# Patient Record
Sex: MALE | Race: WHITE | HISPANIC OR LATINO | URBAN - METROPOLITAN AREA
[De-identification: names, ages, dates, MRNs, and addresses within clinical notes are randomized per-mention and may not be internally consistent; named-entity substitution may affect disease eponyms.]

---

## 2022-01-26 ENCOUNTER — EMERGENCY (EMERGENCY)
Facility: HOSPITAL | Age: 32
LOS: 1 days | Discharge: HOME | End: 2022-01-26
Attending: EMERGENCY MEDICINE
Payer: SELF-PAY

## 2022-01-26 VITALS
OXYGEN SATURATION: 99 % | SYSTOLIC BLOOD PRESSURE: 171 MMHG | TEMPERATURE: 98 F | HEART RATE: 80 BPM | DIASTOLIC BLOOD PRESSURE: 122 MMHG | RESPIRATION RATE: 20 BRPM

## 2022-01-26 VITALS
WEIGHT: 169.98 LBS | HEIGHT: 70 IN | DIASTOLIC BLOOD PRESSURE: 124 MMHG | SYSTOLIC BLOOD PRESSURE: 184 MMHG | RESPIRATION RATE: 20 BRPM | HEART RATE: 102 BPM | TEMPERATURE: 98 F | OXYGEN SATURATION: 96 %

## 2022-01-26 LAB
ALBUMIN SERPL ELPH-MCNC: 4.2 G/DL — SIGNIFICANT CHANGE UP (ref 3.3–5)
ALP SERPL-CCNC: 163 U/L — HIGH (ref 40–120)
ALT FLD-CCNC: 15 U/L — SIGNIFICANT CHANGE UP (ref 10–45)
ANION GAP SERPL CALC-SCNC: 12 MMOL/L — SIGNIFICANT CHANGE UP (ref 5–17)
AST SERPL-CCNC: 19 U/L — SIGNIFICANT CHANGE UP (ref 10–40)
BASE EXCESS BLDV CALC-SCNC: 0.3 MMOL/L — SIGNIFICANT CHANGE UP (ref -2–2)
BILIRUB SERPL-MCNC: 0.3 MG/DL — SIGNIFICANT CHANGE UP (ref 0.2–1.2)
BUN SERPL-MCNC: 12 MG/DL — SIGNIFICANT CHANGE UP (ref 7–23)
CA-I SERPL-SCNC: 1.18 MMOL/L — SIGNIFICANT CHANGE UP (ref 1.15–1.33)
CALCIUM SERPL-MCNC: 9.4 MG/DL — SIGNIFICANT CHANGE UP (ref 8.4–10.5)
CHLORIDE BLDV-SCNC: 105 MMOL/L — SIGNIFICANT CHANGE UP (ref 96–108)
CHLORIDE SERPL-SCNC: 103 MMOL/L — SIGNIFICANT CHANGE UP (ref 96–108)
CO2 BLDV-SCNC: 27 MMOL/L — HIGH (ref 22–26)
CO2 SERPL-SCNC: 23 MMOL/L — SIGNIFICANT CHANGE UP (ref 22–31)
CREAT SERPL-MCNC: 1.14 MG/DL — SIGNIFICANT CHANGE UP (ref 0.5–1.3)
CRP SERPL-MCNC: 20 MG/L — HIGH (ref 0–4)
GAS PNL BLDV: 135 MMOL/L — LOW (ref 136–145)
GAS PNL BLDV: SIGNIFICANT CHANGE UP
GAS PNL BLDV: SIGNIFICANT CHANGE UP
GLUCOSE BLDV-MCNC: 101 MG/DL — HIGH (ref 70–99)
GLUCOSE SERPL-MCNC: 97 MG/DL — SIGNIFICANT CHANGE UP (ref 70–99)
HCO3 BLDV-SCNC: 26 MMOL/L — SIGNIFICANT CHANGE UP (ref 22–29)
HCT VFR BLD CALC: 46 % — SIGNIFICANT CHANGE UP (ref 39–50)
HCT VFR BLDA CALC: 42 % — SIGNIFICANT CHANGE UP (ref 39–51)
HGB BLD CALC-MCNC: 14 G/DL — SIGNIFICANT CHANGE UP (ref 12.6–17.4)
HGB BLD-MCNC: 14.9 G/DL — SIGNIFICANT CHANGE UP (ref 13–17)
LACTATE BLDV-MCNC: 0.9 MMOL/L — SIGNIFICANT CHANGE UP (ref 0.7–2)
MCHC RBC-ENTMCNC: 31.6 PG — SIGNIFICANT CHANGE UP (ref 27–34)
MCHC RBC-ENTMCNC: 32.4 GM/DL — SIGNIFICANT CHANGE UP (ref 32–36)
MCV RBC AUTO: 97.7 FL — SIGNIFICANT CHANGE UP (ref 80–100)
NRBC # BLD: 0 /100 WBCS — SIGNIFICANT CHANGE UP (ref 0–0)
PCO2 BLDV: 45 MMHG — SIGNIFICANT CHANGE UP (ref 42–55)
PH BLDV: 7.37 — SIGNIFICANT CHANGE UP (ref 7.32–7.43)
PLATELET # BLD AUTO: 316 K/UL — SIGNIFICANT CHANGE UP (ref 150–400)
PO2 BLDV: 35 MMHG — SIGNIFICANT CHANGE UP (ref 25–45)
POTASSIUM BLDV-SCNC: 4.4 MMOL/L — SIGNIFICANT CHANGE UP (ref 3.5–5.1)
POTASSIUM SERPL-MCNC: 4.5 MMOL/L — SIGNIFICANT CHANGE UP (ref 3.5–5.3)
POTASSIUM SERPL-SCNC: 4.5 MMOL/L — SIGNIFICANT CHANGE UP (ref 3.5–5.3)
PROT SERPL-MCNC: 7.7 G/DL — SIGNIFICANT CHANGE UP (ref 6–8.3)
RBC # BLD: 4.71 M/UL — SIGNIFICANT CHANGE UP (ref 4.2–5.8)
RBC # FLD: 12.1 % — SIGNIFICANT CHANGE UP (ref 10.3–14.5)
SAO2 % BLDV: 61.1 % — LOW (ref 67–88)
SODIUM SERPL-SCNC: 138 MMOL/L — SIGNIFICANT CHANGE UP (ref 135–145)
WBC # BLD: 6.1 K/UL — SIGNIFICANT CHANGE UP (ref 3.8–10.5)
WBC # FLD AUTO: 6.1 K/UL — SIGNIFICANT CHANGE UP (ref 3.8–10.5)

## 2022-01-26 PROCEDURE — 86140 C-REACTIVE PROTEIN: CPT

## 2022-01-26 PROCEDURE — 82803 BLOOD GASES ANY COMBINATION: CPT

## 2022-01-26 PROCEDURE — 86038 ANTINUCLEAR ANTIBODIES: CPT

## 2022-01-26 PROCEDURE — 86592 SYPHILIS TEST NON-TREP QUAL: CPT

## 2022-01-26 PROCEDURE — 85652 RBC SED RATE AUTOMATED: CPT

## 2022-01-26 PROCEDURE — 82435 ASSAY OF BLOOD CHLORIDE: CPT

## 2022-01-26 PROCEDURE — 36415 COLL VENOUS BLD VENIPUNCTURE: CPT

## 2022-01-26 PROCEDURE — 82947 ASSAY GLUCOSE BLOOD QUANT: CPT

## 2022-01-26 PROCEDURE — 86703 HIV-1/HIV-2 1 RESULT ANTBDY: CPT

## 2022-01-26 PROCEDURE — 71045 X-RAY EXAM CHEST 1 VIEW: CPT | Mod: 26

## 2022-01-26 PROCEDURE — 84132 ASSAY OF SERUM POTASSIUM: CPT

## 2022-01-26 PROCEDURE — 85014 HEMATOCRIT: CPT

## 2022-01-26 PROCEDURE — 86780 TREPONEMA PALLIDUM: CPT

## 2022-01-26 PROCEDURE — 93005 ELECTROCARDIOGRAM TRACING: CPT

## 2022-01-26 PROCEDURE — 80053 COMPREHEN METABOLIC PANEL: CPT

## 2022-01-26 PROCEDURE — 85018 HEMOGLOBIN: CPT

## 2022-01-26 PROCEDURE — 99285 EMERGENCY DEPT VISIT HI MDM: CPT

## 2022-01-26 PROCEDURE — 85027 COMPLETE CBC AUTOMATED: CPT

## 2022-01-26 PROCEDURE — 71045 X-RAY EXAM CHEST 1 VIEW: CPT

## 2022-01-26 PROCEDURE — 84295 ASSAY OF SERUM SODIUM: CPT

## 2022-01-26 PROCEDURE — 86593 SYPHILIS TEST NON-TREP QUANT: CPT

## 2022-01-26 PROCEDURE — 82330 ASSAY OF CALCIUM: CPT

## 2022-01-26 PROCEDURE — 83605 ASSAY OF LACTIC ACID: CPT

## 2022-01-26 PROCEDURE — 99285 EMERGENCY DEPT VISIT HI MDM: CPT | Mod: 25

## 2022-01-26 NOTE — ED ADULT NURSE NOTE - ED STAT RN HANDOFF DETAILS 2
Report received from change of shift MAGY Trujillo. Patient resting comfortably. Comfort and safety provided.

## 2022-01-26 NOTE — ED PROVIDER NOTE - RAPID ASSESSMENT
31 M w/ PMHx of c/o worsening itching/tingling rash starting on back and spreading to arms and face x 2 weeks. Denies recent travel. COVID vaccinated. Endorses tobacco use, 1 pack every 3 days. Denies sore throat.     **Pt seen in the waiting room via teletriage by Yaron Weiss), documentation completed by Moreno Wheeler. Pt to be sent to main ED for further evaluation - all orders placed to be followed by MD in the main ED**   Scribe Statement: Kristin KENNY Cole (scribe), attest that this documentation has been prepared under the direction and in the presence of Yaron Weiss) 31 M w/ PMHx of c/o worsening mildy itching/tingling rash starting on left shoulder/back k and spreading to arms and face x 2 weeks and chest. Denies recent travel, new meds, foods, plants pets, detergents soaps perfumes, COVID vaccinated. Endorses tobacco use, 1 pack every 3 days. Denies sore throat.     **Pt seen in the waiting room via teletriage by Yaron Weiss), documentation completed by Scribrosa -Moreno Foster. Pt to be sent to main ED for further evaluation - all orders placed to be followed by MD in the main ED**   Scribe Statement: IKristin Cole (scribe), attest that this documentation has been prepared under the direction and in the presence of Yaron Weiss)    PT seen in Triage/Tele/Qdoc w scribe. Full H&P to be performed once pt is seen in main ED.  Yaron Weiss MD, Facep

## 2022-01-26 NOTE — ED PROVIDER NOTE - SKIN, MLM
back and trunk with erythematous raised rash, rose tree pattern without obvious herald lesion, does not floresce, also on face, no involvement of the palms and soles, no oral lesions

## 2022-01-26 NOTE — ED PROVIDER NOTE - OBJECTIVE STATEMENT
31y M w/ no pertinent PMHx presents to the ED c/o worsening rash w/ mild itching/sensitivity, intermittent pain starting on back and spreading to arms, face, chest x2-3wks. Rash is not on legs/feet or mouth. Took Benadryl w/ no relief. Took Naproxen for pain. Denies F/C, N/V, cough, congestion. Denies FMHx of autoimmune disorder. Has not seen his PMD yet. Denies recent travel, new meds, foods, plants, pets, detergents, soaps, IV drug use, hot tub use, gym locker room, perfumes. Pt is current smoker, endorses tobacco use, 1 pack every 3 days.

## 2022-01-26 NOTE — ED PROVIDER NOTE - WR ORDER DATE AND TIME 1
Detail Level: Detailed Quality 130: Documentation Of Current Medications In The Medical Record: Current Medications Documented 26-Jan-2022 21:23

## 2022-01-26 NOTE — ED PROVIDER NOTE - NSFOLLOWUPCLINICS_GEN_ALL_ED_FT
[facility]  [address], [address]  [place] [address]  Phone: [phone]  Fax: [phone]

## 2022-01-26 NOTE — ED PROVIDER NOTE - ATTENDING CONTRIBUTION TO CARE
Pt with rash, started upper back, now diffuse upper back and chest and now face, symmetric, scaling maculopapular rash, started 3wks ago s/p covid booster.  Not really itchy, no fever, no dyspnea.  Well appearing.  Male who has sex with men, uses protection 100% time. No prior STIs.

## 2022-01-26 NOTE — ED ADULT NURSE REASSESSMENT NOTE - NS ED NURSE REASSESS COMMENT FT1
patient remains hypertensive, per MD ring ok to d/c and patient aware to make f/u with PCP for HTN management

## 2022-01-26 NOTE — ED PROVIDER NOTE - NSFOLLOWUPINSTRUCTIONS_ED_ALL_ED_FT
1 person assist/verbal cues
verbal cues/1 person assist
follow up with dermatology in 1-2 days    You need to follow up on your blood pressure it was elevated in the ED. This can be done with your primary care doctor     Rash    A rash is a change in the color of the skin. A rash can also change the way your skin feels. There are many different conditions and factors that can cause a rash, most of which are not dangerous. Make sure to follow up with your primary care physician or a dermatologist as instructed by your health care provider.    SEEK IMMEDIATE MEDICAL CARE IF YOU HAVE ANY OF THE FOLLOWING SYMPTOMS: fever, blisters, a rash inside your mouth, vaginal or anal pain, or altered mental status.

## 2022-01-26 NOTE — ED PROVIDER NOTE - CLINICAL SUMMARY MEDICAL DECISION MAKING FREE TEXT BOX
Dr. Maya Note: scaling maculopapular rash of trunk and face s/p booster shot, consider drug rash versus pityriasis, would also screen for syphilis and HIV (much less likely given sparing of palms/soles), no signs of toxicity, would defer steroid use and have pt seek second opinion with dermatologist outpatient.

## 2022-01-26 NOTE — ED ADULT NURSE NOTE - OBJECTIVE STATEMENT
Patient is a 30 y/o male with no reported significant PMH presenting to the ED via waiting room with c/o body wide rash worsening over 2 weeks and sharp, shooting, intermittent bilateral leg pain for "a few days." Patient denies allergies, denies eating/drinking anything new, denies use of new products or change in regular habits. Rash present on face, face red and blotchy, rash present on arms with red spotting. Patient reports taking benadryl with no improvement. Patient A&Ox4, breathing unlabored, denies SOB, denies chest pain, denies n/v/d, radial/pedal pulses present/strong bilaterally, cap refill less than 2 secs, moving all extremities w/o difficulty, skin warm, dry, denies fever, cough, chills. Comfort and safety maintained.

## 2022-01-26 NOTE — ED PROVIDER NOTE - PATIENT PORTAL LINK FT
You can access the FollowMyHealth Patient Portal offered by [facility] by registering at the following website: [email or web address]. By joining [employer]’s FollowMyHealth portal, you will also be able to view your health information using other applications (apps) compatible with our system.

## 2022-01-26 NOTE — ED ADULT TRIAGE NOTE - CHIEF COMPLAINT QUOTE
x1week of rash started on back, progressively worsening and spreading to face, chest, arms, and stomach  no relief with benadryl  denies SOB, difficulty breathing

## 2022-01-27 LAB
ERYTHROCYTE [SEDIMENTATION RATE] IN BLOOD: 43 MM/HR — HIGH (ref 0–15)
HIV 1 & 2 AB SERPL IA.RAPID: SIGNIFICANT CHANGE UP
RPR SER-TITR: (no result)
RPR SERPL-ACNC: REACTIVE
T PALLIDUM AB TITR SER: POSITIVE

## 2022-01-27 NOTE — ED POST DISCHARGE NOTE - ADDITIONAL DOCUMENTATION
1/30: + helen salcido to call back admin line - Ann Kuhn PA-C 1/30: + omari, lvm to call back admin line - Ann Kuhn PA-C    1/30 pt called back made regarding OMARI. advised pt to f/u with rheum FERNANDO Robin

## 2022-01-27 NOTE — ED POST DISCHARGE NOTE - RESULT SUMMARY
ESR mildly elevated, CRP was elevated in ED ESR mildly elevated, CRP was elevated in ED. trepenomal ab +, RPR assay reactive, RVP titer 1:256

## 2022-01-27 NOTE — ED POST DISCHARGE NOTE - DETAILS
1/27: patient given appropriate follow up no change in management - Ann Kuhn PA-C 1/28/22: Spoke with Dr. Maya regarding results, recommends pt return to ED for treatment as he is likely in stage 2 of syphilis infection. Attempted contacting pt, left voicemail with admin # for callback. Will reattempt - Williams Fernandez PA-C 1/28/22: Spoke with Dr. Maya regarding results, recommends pt return to ED for treatment as he is likely in stage 2 of syphilis infection. I spoke with pt and explained results. Pt denies previous known infection, is sexually active male partners no barrier protection. I advised immediate return to ED for treatment. pt is in [place] at this time, stated he will go immediately to an ED. pt will call back with specific ED so results can be faxed over. -Williams Fernandez PA-C 1/28/22: Pt called back, stated he will return to [place] today and come to [facility] this afternoon for treatment. all questions answered - Williams Fernandez PA-C 1/28/22: Spoke with Dr. Maya regarding results, recommends pt return to ED for treatment for possible secondary syphilis infection. I spoke with pt and explained results. Pt denies previous known infection, is sexually active male partners no barrier protection. I advised immediate return to ED for treatment. pt is in [place] at this time, stated he will go immediately to an ED. pt will call back with specific ED so results can be faxed over. -Williams Fernandez PA-C 1/27: ESR results:  patient given appropriate follow up no change in management - Ann Kuhn PA-C

## 2022-01-27 NOTE — ED POST DISCHARGE NOTE - OTHER COMMUNICATION
1/29/22: +LESA, called but no answer, lvm for callback. as per chart review pt did not yet return to ED. -Micheal Hays PA-C

## 2022-01-28 PROBLEM — Z00.00 ENCOUNTER FOR PREVENTIVE HEALTH EXAMINATION: Status: ACTIVE | Noted: 2022-01-28

## 2022-01-28 PROBLEM — Z78.9 OTHER SPECIFIED HEALTH STATUS: Chronic | Status: ACTIVE | Noted: 2022-01-26

## 2022-01-28 LAB
ANA PAT FLD IF-IMP: ABNORMAL
ANA TITR SER: ABNORMAL

## 2022-01-29 ENCOUNTER — EMERGENCY (EMERGENCY)
Facility: HOSPITAL | Age: 32
LOS: 1 days | Discharge: HOME | End: 2022-01-29
Attending: STUDENT IN AN ORGANIZED HEALTH CARE EDUCATION/TRAINING PROGRAM
Payer: SELF-PAY

## 2022-01-29 VITALS
HEIGHT: 70 IN | RESPIRATION RATE: 17 BRPM | OXYGEN SATURATION: 98 % | HEART RATE: 96 BPM | DIASTOLIC BLOOD PRESSURE: 118 MMHG | SYSTOLIC BLOOD PRESSURE: 189 MMHG | TEMPERATURE: 98 F | WEIGHT: 169.98 LBS

## 2022-01-29 PROCEDURE — 87491 CHLMYD TRACH DNA AMP PROBE: CPT

## 2022-01-29 PROCEDURE — 99284 EMERGENCY DEPT VISIT MOD MDM: CPT

## 2022-01-29 PROCEDURE — 99283 EMERGENCY DEPT VISIT LOW MDM: CPT | Mod: 25

## 2022-01-29 PROCEDURE — 87591 N.GONORRHOEAE DNA AMP PROB: CPT

## 2022-01-29 PROCEDURE — 96372 THER/PROPH/DIAG INJ SC/IM: CPT

## 2022-01-29 RX ORDER — PENICILLIN G BENZATHINE 1200000 [IU]/2ML
2.4 INJECTION, SUSPENSION INTRAMUSCULAR ONCE
Refills: 0 | Status: COMPLETED | OUTPATIENT
Start: 2022-01-29 | End: 2022-01-29

## 2022-01-29 RX ADMIN — PENICILLIN G BENZATHINE 2.4 MILLION UNIT(S): 1200000 INJECTION, SUSPENSION INTRAMUSCULAR at 18:30

## 2022-01-29 NOTE — ED PROVIDER NOTE - PHYSICAL EXAMINATION
Gen: Patient is well-appearing, NAD, AAOx3, able to ambulate without assistance  Lung: CTAB, no respiratory distress, no wheezes/rhonchi/rales B/L, speaking in full sentences  CV: RRR, no murmurs, rubs or gallops, distal pulses 2+ b/l  Abd: soft, NT, ND, no guarding, no rigidity, no rebound tenderness, no CVA tenderness   Skin: Warm, well perfused, diffuse maculopapular rash on neck, back, extremities, palms and feet, no leg swelling  Psych: normal affect, calm

## 2022-01-29 NOTE — ED PROVIDER NOTE - ATTENDING CONTRIBUTION TO CARE
I, Kevin Kim, performed a history and physical exam of the patient and discussed their management with the resident and/or advanced care provider. I reviewed the resident and/or advanced care provider's note and agree with the documented findings and plan of care. I was present and available for all procedures.    31 y M, no PMH until recently tested positive for RPR and treponema pallidum antibody after a diffuse maculopapular rash, presenting to the ER for the PCN shot. Reports rash has been going on for a few weeks started in his back now radiating to his neck, extremities, palms and feet. Reports extremities pain since the rash started. Denies any other acute symptoms including headache, vision changes, fever, chest pain, shortness of breath. denies penile dc or dysuria    Well appearing and in NAD, head normal appearing atraumatic, trachea midline, no respiratory distress, lungs cta bilaterally, rrr no murmurs, soft NT ND abdomen, no visible extremity deformities, Alert and oriented, non focal neuro exam, skin warm and dry, normal affect and mood, rash to forehead, right palmar and posterior thoracic region.     well appearing will send gc/chlam otherwise treat for secondary syphilis dc id Follow up

## 2022-01-29 NOTE — ED PROVIDER NOTE - CLINICAL SUMMARY MEDICAL DECISION MAKING FREE TEXT BOX
31 y M presenting to the ER for PCN treatment after tested positive with RPR and treponema antibody, and diffuse maculopapular rash extending from back, neck, extremities to palms and soles. Will get PCN G 2.4 million units, collect urine G/C samples and discharge for outpatient follow up. Patient SBP 180s. Patient asymptomatic other than rash and extremities pain. Patient educated for the importance of BP control. Patient reports he will see his PMD once his insurance starts in March 2022.

## 2022-01-29 NOTE — ED PROVIDER NOTE - PATIENT PORTAL LINK FT
You can access the FollowMyHealth Patient Portal offered by [facility] by registering at the following website: [email or web address]. By joining [employer]’s FollowMyHealth portal, you will also be able to view your health information using other applications (apps) compatible with our system.

## 2022-01-29 NOTE — ED PROVIDER NOTE - OBJECTIVE STATEMENT
31 y M, no PMH until recently tested positive for RPR and treponema pallidum antibody after a diffuse maculopapular rash, presenting to the ER for the PCN shot. Reports rash has been going on for a few weeks started in his back now radiating to his neck, extremities, palms and feet. Reports extremities pain since the rash started. Denies any other acute symptoms including headache, vision changes, fever, chest pain, shortness of breath.

## 2022-01-29 NOTE — ED PROVIDER NOTE - NSFOLLOWUPINSTRUCTIONS_ED_ALL_ED_FT
Please follow up with your Primary Medical Doctor routinely to monitor the progression of your symptoms and your blood pressure.     Please come back to the Emergency Room if your symptoms get worse or if you start developing fever, back pain, joint pain, genital pain, pain with urination. Please follow up with your Primary Medical Doctor routinely to monitor the progression of your symptoms and your blood pressure.     Please come back to the Emergency Room if your symptoms get worse or if you start developing fever, back pain, joint pain, genital pain, pain with urination.    Follow up with Infectious Disease for repeat nontreponemal test (eg, RPR) at 6 and 12 months.

## 2022-01-29 NOTE — ED PROVIDER NOTE - NSFOLLOWUPCLINICS_GEN_ALL_ED_FT
[facility] - Infectious Disease  Infectious Disease  [address], [place] [address]  Phone: [phone]  Fax:   Follow Up Time: Routine

## 2022-01-29 NOTE — ED ADULT NURSE NOTE - OBJECTIVE STATEMENT
Pt was seen here in ED 3 days ago for a rash on face, back anf=d arms, referred to dermatology.  Yesterday he was contacted to return to ED for tx of Syphilis.  He states hgis only recent sexual contact is an old boyfriend, whom he states he will notify of his dx.

## 2022-01-29 NOTE — ED ADULT NURSE NOTE - NSICDXPASTMEDICALHX_GEN_ALL_CORE_FT
PAST MEDICAL HISTORY:  No pertinent past medical history      PAST MEDICAL HISTORY:  H/O secondary syphilis of skin     No pertinent past medical history

## 2022-01-31 ENCOUNTER — APPOINTMENT (OUTPATIENT)
Dept: DERMATOLOGY | Facility: CLINIC | Age: 32
End: 2022-01-31

## 2022-01-31 LAB
C TRACH RRNA SPEC QL NAA+PROBE: SIGNIFICANT CHANGE UP
N GONORRHOEA RRNA SPEC QL NAA+PROBE: SIGNIFICANT CHANGE UP

## 2022-02-01 ENCOUNTER — APPOINTMENT (OUTPATIENT)
Dept: DERMATOLOGY | Facility: CLINIC | Age: 32
End: 2022-02-01

## 2022-03-21 PROBLEM — Z86.19 PERSONAL HISTORY OF OTHER INFECTIOUS AND PARASITIC DISEASES: Chronic | Status: ACTIVE | Noted: 2022-01-29

## 2022-03-22 ENCOUNTER — NON-APPOINTMENT (OUTPATIENT)
Age: 32
End: 2022-03-22

## 2022-03-25 ENCOUNTER — NON-APPOINTMENT (OUTPATIENT)
Age: 32
End: 2022-03-25

## 2022-04-05 ENCOUNTER — NON-APPOINTMENT (OUTPATIENT)
Age: 32
End: 2022-04-05

## 2022-04-07 ENCOUNTER — LABORATORY RESULT (OUTPATIENT)
Age: 32
End: 2022-04-07

## 2022-04-07 ENCOUNTER — APPOINTMENT (OUTPATIENT)
Dept: INFECTIOUS DISEASE | Facility: CLINIC | Age: 32
End: 2022-04-07
Payer: COMMERCIAL

## 2022-04-07 VITALS — DIASTOLIC BLOOD PRESSURE: 120 MMHG | SYSTOLIC BLOOD PRESSURE: 163 MMHG

## 2022-04-07 VITALS
BODY MASS INDEX: 26.05 KG/M2 | DIASTOLIC BLOOD PRESSURE: 107 MMHG | RESPIRATION RATE: 16 BRPM | TEMPERATURE: 98.5 F | HEIGHT: 70 IN | HEART RATE: 84 BPM | OXYGEN SATURATION: 95 % | WEIGHT: 182 LBS | SYSTOLIC BLOOD PRESSURE: 177 MMHG

## 2022-04-07 DIAGNOSIS — Z78.9 OTHER SPECIFIED HEALTH STATUS: ICD-10-CM

## 2022-04-07 DIAGNOSIS — F17.200 NICOTINE DEPENDENCE, UNSPECIFIED, UNCOMPLICATED: ICD-10-CM

## 2022-04-07 DIAGNOSIS — Z82.49 FAMILY HISTORY OF ISCHEMIC HEART DISEASE AND OTHER DISEASES OF THE CIRCULATORY SYSTEM: ICD-10-CM

## 2022-04-07 DIAGNOSIS — F12.90 CANNABIS USE, UNSPECIFIED, UNCOMPLICATED: ICD-10-CM

## 2022-04-07 PROCEDURE — 96372 THER/PROPH/DIAG INJ SC/IM: CPT

## 2022-04-07 PROCEDURE — 99202 OFFICE O/P NEW SF 15 MIN: CPT | Mod: 25

## 2022-04-07 RX ADMIN — PENICILLIN G BENZATHINE 2.4 UNIT/4ML: 2400000 INJECTION, SUSPENSION INTRAMUSCULAR at 00:00

## 2022-04-11 ENCOUNTER — NON-APPOINTMENT (OUTPATIENT)
Age: 32
End: 2022-04-11

## 2022-04-11 ENCOUNTER — APPOINTMENT (OUTPATIENT)
Dept: NEPHROLOGY | Facility: CLINIC | Age: 32
End: 2022-04-11
Payer: COMMERCIAL

## 2022-04-11 ENCOUNTER — LABORATORY RESULT (OUTPATIENT)
Age: 32
End: 2022-04-11

## 2022-04-11 VITALS
BODY MASS INDEX: 26.48 KG/M2 | OXYGEN SATURATION: 98 % | DIASTOLIC BLOOD PRESSURE: 121 MMHG | HEIGHT: 70 IN | HEART RATE: 89 BPM | TEMPERATURE: 97.9 F | WEIGHT: 185 LBS | SYSTOLIC BLOOD PRESSURE: 165 MMHG

## 2022-04-11 PROCEDURE — 99204 OFFICE O/P NEW MOD 45 MIN: CPT

## 2022-04-11 RX ORDER — HYDROCORTISONE 25 MG/G
2.5 OINTMENT TOPICAL
Qty: 20 | Refills: 0 | Status: ACTIVE | COMMUNITY
Start: 2022-03-28

## 2022-04-11 NOTE — ASSESSMENT
[FreeTextEntry1] : Mr. CAMARILLO is a 31 year old male with recent secondary syphillis with low C4 and +LESA, hematuria and proteinuria of non nephrotic range and new HTN ( now on norvasc). 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 The vaccine may have trigged the immune response more. \par \par Rpt labs today and see if there is any improvement with treatment of syphillis and if there is, not sure if renal bx will help\par If no improvement and or worsening, would do renal bx as maybe this is an unrelated process to the infection as well.\par Pt understands plan \par Would not dc norvasc yet till syphillis and renal disease resolves\par pt had renal sono- pending results\par \par f/u after labs and will d/w with Dr Chambers\par

## 2022-04-11 NOTE — PHYSICAL EXAM
[General Appearance - Alert] : alert [General Appearance - In No Acute Distress] : in no acute distress [Oropharynx] : the oropharynx was normal [Outer Ear] : the ears and nose were normal in appearance [] : no respiratory distress [Auscultation Breath Sounds / Voice Sounds] : lungs were clear to auscultation bilaterally [Abnormal Walk] : normal gait [FreeTextEntry1] : + edema on face more [Oriented To Time, Place, And Person] : oriented to person, place, and time

## 2022-04-11 NOTE — HISTORY OF PRESENT ILLNESS
[FreeTextEntry1] : Mr. CAMARILLO is a 31 year old male with recent dx of syphillis here with rising crt, hematuria and proteinuria and full body rash. He had no prior hx of renal disease. In Dec-Jan 2022, he was dx with a rash that was syphillis. He had skin bx that confirmed it. His RPR was + and rapid plasma reagin assay+, he also had + LESA, and hence was seen by rheum and was ruled out for lupus. He had on and off jt pains. Labs and urine noted for crt 1.6 and hematuria, RBCS, and 1.6gm of proteinuria. No prior hx of any kidney stones, GNs, HIV, Hep. His other infectious workup was neg. He had a booster COVID vaccine in Jan 2022 as well. No prior hx of COVID. No NSAID use.\par He has no gross hematuria.\par He was seen by Dr Gigi Chambers and is here for an opinion re if he needs a renal bx or not.\par He was seen by ID as well and has received one dose of PCN in Jan 2022 and then a repeat 2 doses last week since his rash and symptoms were not improving. \par No n/v. No decrease in appetite, no tremors. No edema worsening. no decreased sleep. No foamy urine. no hematuria, no dysuria. no confusion. No SOB, ARIAS. No wt loss.\par

## 2022-04-12 DIAGNOSIS — R31.29 OTHER MICROSCOPIC HEMATURIA: ICD-10-CM

## 2022-04-12 LAB
ALBUMIN SERPL ELPH-MCNC: 4.5 G/DL
ANION GAP SERPL CALC-SCNC: 15 MMOL/L
APPEARANCE: CLEAR
APTT BLD: 29.5 SEC
BACTERIA: NEGATIVE
BASOPHILS # BLD AUTO: 0.06 K/UL
BASOPHILS NFR BLD AUTO: 0.8 %
BILIRUBIN URINE: NEGATIVE
BLOOD URINE: ABNORMAL
BUN SERPL-MCNC: 17 MG/DL
C3 SERPL-MCNC: 154 MG/DL
C4 SERPL-MCNC: 22 MG/DL
CALCIUM SERPL-MCNC: 9.5 MG/DL
CHLORIDE SERPL-SCNC: 104 MMOL/L
CO2 SERPL-SCNC: 20 MMOL/L
COLOR: COLORLESS
CREAT SERPL-MCNC: 1.56 MG/DL
CREAT SPEC-SCNC: 138 MG/DL
CREAT/PROT UR: 1.2 RATIO
EGFR: 60 ML/MIN/1.73M2
EOSINOPHIL # BLD AUTO: 0.49 K/UL
EOSINOPHIL NFR BLD AUTO: 6.5 %
GLUCOSE QUALITATIVE U: NEGATIVE
GLUCOSE SERPL-MCNC: 103 MG/DL
HCT VFR BLD CALC: 48.9 %
HGB BLD-MCNC: 16.4 G/DL
HIV1+2 AB SPEC QL IA.RAPID: NONREACTIVE
HYALINE CASTS: 0 /LPF
IMM GRANULOCYTES NFR BLD AUTO: 0.4 %
INR PPP: 0.92 RATIO
KETONES URINE: NEGATIVE
LEUKOCYTE ESTERASE URINE: NEGATIVE
LYMPHOCYTES # BLD AUTO: 2.42 K/UL
LYMPHOCYTES NFR BLD AUTO: 32.2 %
MAN DIFF?: NORMAL
MCHC RBC-ENTMCNC: 29.9 PG
MCHC RBC-ENTMCNC: 33.5 GM/DL
MCV RBC AUTO: 89.2 FL
MICROSCOPIC-UA: NORMAL
MONOCYTES # BLD AUTO: 0.49 K/UL
MONOCYTES NFR BLD AUTO: 6.5 %
NEUTROPHILS # BLD AUTO: 4.03 K/UL
NEUTROPHILS NFR BLD AUTO: 53.6 %
NITRITE URINE: NEGATIVE
PH URINE: 6
PHOSPHATE SERPL-MCNC: 3.2 MG/DL
PLATELET # BLD AUTO: 291 K/UL
POTASSIUM SERPL-SCNC: 4.4 MMOL/L
PROT UR-MCNC: 171 MG/DL
PROTEIN URINE: ABNORMAL
PT BLD: 10.8 SEC
RBC # BLD: 5.48 M/UL
RBC # FLD: 11.6 %
RED BLOOD CELLS URINE: 11 /HPF
RPR SER-TITR: ABNORMAL
SODIUM SERPL-SCNC: 139 MMOL/L
SPECIFIC GRAVITY URINE: 1.02
SQUAMOUS EPITHELIAL CELLS: 0 /HPF
UROBILINOGEN URINE: NORMAL
WBC # FLD AUTO: 7.52 K/UL
WHITE BLOOD CELLS URINE: 3 /HPF

## 2022-04-13 LAB — DSDNA AB SER-ACNC: <12 IU/ML

## 2022-04-14 ENCOUNTER — APPOINTMENT (OUTPATIENT)
Dept: INFECTIOUS DISEASE | Facility: CLINIC | Age: 32
End: 2022-04-14

## 2022-04-14 ENCOUNTER — MED ADMIN CHARGE (OUTPATIENT)
Age: 32
End: 2022-04-14

## 2022-04-14 ENCOUNTER — NON-APPOINTMENT (OUTPATIENT)
Age: 32
End: 2022-04-14

## 2022-04-14 LAB
ANA SER IF-ACNC: NEGATIVE
RPR SER-TITR: ABNORMAL
T PALLIDUM AB SER QL IA: POSITIVE

## 2022-04-14 RX ORDER — PENICILLIN G BENZATHINE 2400000 [IU]/4ML
2400000 INJECTION, SUSPENSION INTRAMUSCULAR
Qty: 0 | Refills: 0 | Status: COMPLETED | OUTPATIENT
Start: 2022-04-07

## 2022-04-14 NOTE — PHYSICAL EXAM
[General Appearance - Alert] : alert [General Appearance - In No Acute Distress] : in no acute distress [Sclera] : the sclera and conjunctiva were normal [PERRL With Normal Accommodation] : pupils were equal in size, round, reactive to light [Extraocular Movements] : extraocular movements were intact [Outer Ear] : the ears and nose were normal in appearance [Oropharynx] : the oropharynx was normal with no thrush [Neck Appearance] : the appearance of the neck was normal [Neck Cervical Mass (___cm)] : no neck mass was observed [Jugular Venous Distention Increased] : there was no jugular-venous distention [Thyroid Diffuse Enlargement] : the thyroid was not enlarged [Auscultation Breath Sounds / Voice Sounds] : lungs were clear to auscultation bilaterally [Heart Sounds] : normal S1 and S2 [Full Pulse] : the pedal pulses are present [Edema] : there was no peripheral edema [Bowel Sounds] : normal bowel sounds [Abdomen Tenderness] : non-tender [Abdomen Soft] : soft [] : no hepato-splenomegaly [Abdomen Mass (___ Cm)] : no abdominal mass palpated [Costovertebral Angle Tenderness] : no CVA tenderness [Musculoskeletal - Swelling] : no joint swelling [FreeTextEntry1] : rash on arms [No Focal Deficits] : no focal deficits [Oriented To Time, Place, And Person] : oriented to person, place, and time [Affect] : the affect was normal

## 2022-04-14 NOTE — CONSULT LETTER
[Dear  ___] : Dear  [unfilled], [Courtesy Letter:] : I had the pleasure of seeing your patient, [unfilled], in my office today. [Please see my note below.] : Please see my note below. [Consult Closing:] : Thank you very much for allowing me to participate in the care of this patient.  If you have any questions, please do not hesitate to contact me. [Sincerely,] : Sincerely, [FreeTextEntry2] : Gigi Chambers MD  [FreeTextEntry3] : Harry Alves\par Attending Physician\par Infectious Disease\par [facility]\par [facility]\par [address]\[place] [address]\par Tel: [phone]\par Fax: [phone]\par Email: [email or web address]\par \par

## 2022-04-14 NOTE — ASSESSMENT
[FreeTextEntry1] : 32 y/o male with syphilis, rash and elevated creatinine comes for visit. \par \par Unclear if his renal issues are related to syphilis. He did get one shot of PCN.\par \par Will repeat an RPR. He refused other STD workup. Concern from renal was could his creatinine issues be related to syphilis.\par \par Completing 3 shots of PCN is not unreasonable if RPR still elevated and given his creatinine issues. \par \par Has f/u with 2nd renal evaluation next week. \par \par D/w pt\par \par D/w Dr. Chambers from Renal.  [Treatment Education] : treatment education [Treatment Adherence] : treatment adherence [Rx Dose / Side Effects] : Rx dose/side effects [Risk Reduction] : risk reduction [Universal Precautions] : universal precautions [Disclosure of Diagnosis] : disclosure of diagnosis [Sexuality / Safer Sex] : sexuality/safer sex [Partner Notification Info/Discussion] : partner notification info/discussion

## 2022-04-14 NOTE — HISTORY OF PRESENT ILLNESS
[FreeTextEntry1] : 30 y/o male comes for visit for syphilis.\par \par He had a rash in November and in January went to ER and was diagnosed with syphilis and given one PCN shot. \par \par He also noted to have an elevated creatinine and LESA and went to see renal. He may need a renal biopsy. \par \par He feels ok but still has some residual rash. \par \par No h/o prior STDs.\par \par No dysuria, fever, urethral discharge.

## 2022-04-15 LAB
ALBUMIN MFR SERPL ELPH: 60.3 %
ALBUMIN SERPL-MCNC: 4.5 G/DL
ALBUMIN/GLOB SERPL: 1.5 RATIO
ALPHA1 GLOB MFR SERPL ELPH: 3.3 %
ALPHA1 GLOB SERPL ELPH-MCNC: 0.2 G/DL
ALPHA2 GLOB MFR SERPL ELPH: 11.9 %
ALPHA2 GLOB SERPL ELPH-MCNC: 0.9 G/DL
B-GLOBULIN MFR SERPL ELPH: 11.8 %
B-GLOBULIN SERPL ELPH-MCNC: 0.9 G/DL
DEPRECATED KAPPA LC FREE/LAMBDA SER: 0.97 RATIO
ENA RNP AB SER IA-ACNC: <0.2 AL
ENA SM AB SER IA-ACNC: <0.2 AL
GAMMA GLOB FLD ELPH-MCNC: 1 G/DL
GAMMA GLOB MFR SERPL ELPH: 12.7 %
IGA SER QL IEP: 212 MG/DL
IGG SER QL IEP: 1010 MG/DL
IGM SER QL IEP: 79 MG/DL
INTERPRETATION SERPL IEP-IMP: NORMAL
KAPPA LC CSF-MCNC: 3.6 MG/DL
KAPPA LC SERPL-MCNC: 3.5 MG/DL
M PROTEIN SPEC IFE-MCNC: NORMAL
PROT SERPL-MCNC: 7.5 G/DL
PROT SERPL-MCNC: 7.5 G/DL

## 2022-04-21 ENCOUNTER — APPOINTMENT (OUTPATIENT)
Dept: INFECTIOUS DISEASE | Facility: CLINIC | Age: 32
End: 2022-04-21
Payer: COMMERCIAL

## 2022-04-21 PROCEDURE — 96372 THER/PROPH/DIAG INJ SC/IM: CPT

## 2022-04-21 RX ORDER — PENICILLIN G BENZATHINE 2400000 [IU]/4ML
2400000 INJECTION, SUSPENSION INTRAMUSCULAR
Qty: 0 | Refills: 0 | Status: COMPLETED | OUTPATIENT
Start: 2022-04-21

## 2022-04-21 RX ADMIN — PENICILLIN G BENZATHINE 2.4 UNIT/4ML: 2400000 INJECTION, SUSPENSION INTRAMUSCULAR at 00:00

## 2022-04-22 ENCOUNTER — OUTPATIENT (OUTPATIENT)
Dept: OUTPATIENT SERVICES | Facility: HOSPITAL | Age: 32
LOS: 1 days | End: 2022-04-22
Payer: SELF-PAY

## 2022-04-22 DIAGNOSIS — Z11.52 ENCOUNTER FOR SCREENING FOR COVID-19: ICD-10-CM

## 2022-04-22 LAB — SARS-COV-2 RNA SPEC QL NAA+PROBE: SIGNIFICANT CHANGE UP

## 2022-04-22 PROCEDURE — C9803: CPT

## 2022-04-22 PROCEDURE — U0005: CPT

## 2022-04-22 PROCEDURE — U0003: CPT

## 2022-04-25 ENCOUNTER — APPOINTMENT (OUTPATIENT)
Dept: ULTRASOUND IMAGING | Facility: HOSPITAL | Age: 32
End: 2022-04-25
Payer: COMMERCIAL

## 2022-04-25 ENCOUNTER — RESULT REVIEW (OUTPATIENT)
Age: 32
End: 2022-04-25

## 2022-04-25 ENCOUNTER — OUTPATIENT (OUTPATIENT)
Dept: OUTPATIENT SERVICES | Facility: HOSPITAL | Age: 32
LOS: 1 days | End: 2022-04-25
Payer: COMMERCIAL

## 2022-04-25 DIAGNOSIS — R31.29 OTHER MICROSCOPIC HEMATURIA: ICD-10-CM

## 2022-04-25 DIAGNOSIS — Z00.00 ENCOUNTER FOR GENERAL ADULT MEDICAL EXAMINATION WITHOUT ABNORMAL FINDINGS: ICD-10-CM

## 2022-04-25 PROCEDURE — 88348 ELECTRON MICROSCOPY DX: CPT

## 2022-04-25 PROCEDURE — 76942 ECHO GUIDE FOR BIOPSY: CPT

## 2022-04-25 PROCEDURE — 88348 ELECTRON MICROSCOPY DX: CPT | Mod: 26

## 2022-04-25 PROCEDURE — 88350 IMFLUOR EA ADDL 1ANTB STN PX: CPT | Mod: 26

## 2022-04-25 PROCEDURE — 88305 TISSUE EXAM BY PATHOLOGIST: CPT | Mod: 26

## 2022-04-25 PROCEDURE — 88313 SPECIAL STAINS GROUP 2: CPT | Mod: 26

## 2022-04-25 PROCEDURE — 88346 IMFLUOR 1ST 1ANTB STAIN PX: CPT | Mod: 26

## 2022-04-25 PROCEDURE — 50200 RENAL BIOPSY PERQ: CPT

## 2022-04-25 PROCEDURE — 50200 RENAL BIOPSY PERQ: CPT | Mod: LT

## 2022-04-25 PROCEDURE — 88346 IMFLUOR 1ST 1ANTB STAIN PX: CPT

## 2022-04-25 PROCEDURE — 88313 SPECIAL STAINS GROUP 2: CPT

## 2022-04-25 PROCEDURE — 88305 TISSUE EXAM BY PATHOLOGIST: CPT

## 2022-04-25 PROCEDURE — 76942 ECHO GUIDE FOR BIOPSY: CPT | Mod: 26

## 2022-04-25 PROCEDURE — 88350 IMFLUOR EA ADDL 1ANTB STN PX: CPT

## 2022-04-27 LAB — SURGICAL PATHOLOGY STUDY: SIGNIFICANT CHANGE UP

## 2022-05-03 RX ORDER — PENICILLIN G BENZATHINE 2400000 [IU]/4ML
2400000 INJECTION, SUSPENSION INTRAMUSCULAR
Qty: 0 | Refills: 0 | Status: COMPLETED | OUTPATIENT
Start: 2022-05-03

## 2022-05-09 ENCOUNTER — NON-APPOINTMENT (OUTPATIENT)
Age: 32
End: 2022-05-09

## 2022-05-09 ENCOUNTER — APPOINTMENT (OUTPATIENT)
Dept: INTERNAL MEDICINE | Facility: CLINIC | Age: 32
End: 2022-05-09
Payer: COMMERCIAL

## 2022-05-09 ENCOUNTER — LABORATORY RESULT (OUTPATIENT)
Age: 32
End: 2022-05-09

## 2022-05-09 VITALS
WEIGHT: 185 LBS | HEART RATE: 66 BPM | SYSTOLIC BLOOD PRESSURE: 161 MMHG | DIASTOLIC BLOOD PRESSURE: 105 MMHG | HEIGHT: 70 IN | BODY MASS INDEX: 26.48 KG/M2 | TEMPERATURE: 98.3 F | OXYGEN SATURATION: 98 %

## 2022-05-09 VITALS — DIASTOLIC BLOOD PRESSURE: 102 MMHG | SYSTOLIC BLOOD PRESSURE: 152 MMHG

## 2022-05-09 DIAGNOSIS — Z00.00 ENCOUNTER FOR GENERAL ADULT MEDICAL EXAMINATION W/OUT ABNORMAL FINDINGS: ICD-10-CM

## 2022-05-09 DIAGNOSIS — I10 ESSENTIAL (PRIMARY) HYPERTENSION: ICD-10-CM

## 2022-05-09 DIAGNOSIS — A53.9 SYPHILIS, UNSPECIFIED: ICD-10-CM

## 2022-05-09 PROCEDURE — 99385 PREV VISIT NEW AGE 18-39: CPT | Mod: 25

## 2022-05-09 PROCEDURE — 99203 OFFICE O/P NEW LOW 30 MIN: CPT | Mod: 25

## 2022-05-09 PROCEDURE — 99214 OFFICE O/P EST MOD 30 MIN: CPT | Mod: 25

## 2022-05-09 PROCEDURE — 99395 PREV VISIT EST AGE 18-39: CPT | Mod: 25

## 2022-05-09 PROCEDURE — 36415 COLL VENOUS BLD VENIPUNCTURE: CPT

## 2022-05-09 RX ORDER — AMLODIPINE BESYLATE 5 MG/1
5 TABLET ORAL
Qty: 90 | Refills: 3 | Status: ACTIVE | COMMUNITY
Start: 2022-04-05 | End: 1900-01-01

## 2022-05-09 NOTE — ASSESSMENT
[FreeTextEntry1] : Annual Physical Exam\par -Blood work and EKG done today. Assess for other cardiac risk factors.\par -Continue f/u with nephrology given CKD. -Increase amlodipine to 10 mg.\par -Continue f/u with Dermatology.\par -Discussed lifestyle modification and low-salt diet.\par -Advised to monitor blood pressure and keep log. Instructions on how to use discussed. Call office if consistently having High blood pressure.\par -RTO annually or as needed.

## 2022-05-09 NOTE — HISTORY OF PRESENT ILLNESS
[FreeTextEntry1] : Patient presents to establish care and annual physical exam.  [de-identified] : Patient presents to office with Hx of HTN.\par Pt c/o Rash occurred right after the booster shot for Covid-19 vaccine 5 months ago. Started on the back, arms, neck then face.\par He went to ED and was found to have elevated blood pressure and  high LESA levels, advised to go to Rheumatologist.\par He is using steroid cream with minimal alleviation.\par Pt reports of checking his blood pressure once in a while, usually in 125-135/95. \par \par Pt notes of increased frequency urinating. Denies any hematuria. Saw nephrologist, had a biopsy and was told to have mild damage. Has a f/u in June.\par Also notes of Intermittent Headache Denies palpitations, numbness or tingling sensation.\par Also seeing Rheumatologist and Dermatologist, did biopsy for the rash.\par Seeing Infectious Disease, had penicillin shots. Also advised that the shot is not related with the rash.\par \par Currently decrease alcohol intake because of flare ups of the rash on his face.\par Has tried smoking cessation.\par FHx: HTN\par SocHx: Social alcohol use, current smoker 1 pack every 2-3 days. 1 cup of coffee/day.

## 2022-05-09 NOTE — ADDENDUM
[FreeTextEntry1] : I, Danay Gayle, acted as a scribe on behalf of Dr. Larry Bashir MD, on 05/09/2022. \par \par All medical entries made by the scribe were at my, Dr. Larry Bashir MD, direction and personally dictated by me on 05/09/2022. I have reviewed the chart and agree that the record accurately reflects my personal performance of the history, physical exam, assessment and plan. I have also personally directed, reviewed, and agreed with the chart.

## 2022-05-09 NOTE — PHYSICAL EXAM
[No Acute Distress] : no acute distress [Well Nourished] : well nourished [Well Developed] : well developed [Well-Appearing] : well-appearing [Normal Sclera/Conjunctiva] : normal sclera/conjunctiva [PERRL] : pupils equal round and reactive to light [EOMI] : extraocular movements intact [Normal Outer Ear/Nose] : the outer ears and nose were normal in appearance [Normal Oropharynx] : the oropharynx was normal [No JVD] : no jugular venous distention [No Lymphadenopathy] : no lymphadenopathy [Supple] : supple [Thyroid Normal, No Nodules] : the thyroid was normal and there were no nodules present [No Respiratory Distress] : no respiratory distress  [No Accessory Muscle Use] : no accessory muscle use [Clear to Auscultation] : lungs were clear to auscultation bilaterally [Normal Rate] : normal rate  [Regular Rhythm] : with a regular rhythm [Normal S1, S2] : normal S1 and S2 [No Murmur] : no murmur heard [No Carotid Bruits] : no carotid bruits [No Abdominal Bruit] : a ~M bruit was not heard ~T in the abdomen [No Varicosities] : no varicosities [Pedal Pulses Present] : the pedal pulses are present [No Edema] : there was no peripheral edema [No Palpable Aorta] : no palpable aorta [No Extremity Clubbing/Cyanosis] : no extremity clubbing/cyanosis [Soft] : abdomen soft [Non Tender] : non-tender [Non-distended] : non-distended [No Masses] : no abdominal mass palpated [No HSM] : no HSM [Normal Bowel Sounds] : normal bowel sounds [Normal Posterior Cervical Nodes] : no posterior cervical lymphadenopathy [Normal Anterior Cervical Nodes] : no anterior cervical lymphadenopathy [No CVA Tenderness] : no CVA  tenderness [No Spinal Tenderness] : no spinal tenderness [No Joint Swelling] : no joint swelling [Grossly Normal Strength/Tone] : grossly normal strength/tone [No Rash] : no rash [Coordination Grossly Intact] : coordination grossly intact [No Focal Deficits] : no focal deficits [Normal Gait] : normal gait [Deep Tendon Reflexes (DTR)] : deep tendon reflexes were 2+ and symmetric [Normal Affect] : the affect was normal [Normal Insight/Judgement] : insight and judgment were intact [de-identified] : Multiple hypopigmented patches throughout the body

## 2022-05-13 DIAGNOSIS — R79.89 OTHER SPECIFIED ABNORMAL FINDINGS OF BLOOD CHEMISTRY: ICD-10-CM

## 2022-05-13 DIAGNOSIS — E78.1 PURE HYPERGLYCERIDEMIA: ICD-10-CM

## 2022-05-13 LAB
25(OH)D3 SERPL-MCNC: 8.3 NG/ML
ALBUMIN SERPL ELPH-MCNC: 4.7 G/DL
ALP BLD-CCNC: 95 U/L
ALT SERPL-CCNC: 33 U/L
ANION GAP SERPL CALC-SCNC: 17 MMOL/L
APPEARANCE: CLEAR
APPEARANCE: CLEAR
AST SERPL-CCNC: 28 U/L
BACTERIA: NEGATIVE
BASOPHILS # BLD AUTO: 0.07 K/UL
BASOPHILS NFR BLD AUTO: 0.8 %
BILIRUB SERPL-MCNC: 0.2 MG/DL
BILIRUBIN URINE: NEGATIVE
BILIRUBIN URINE: NEGATIVE
BLOOD URINE: ABNORMAL
BLOOD URINE: ABNORMAL
BUN SERPL-MCNC: 28 MG/DL
CALCIUM SERPL-MCNC: 9.4 MG/DL
CHLORIDE SERPL-SCNC: 101 MMOL/L
CHOLEST SERPL-MCNC: 245 MG/DL
CO2 SERPL-SCNC: 20 MMOL/L
COLOR: NORMAL
COLOR: NORMAL
CREAT SERPL-MCNC: 1.73 MG/DL
CREAT SPEC-SCNC: 129 MG/DL
CREAT/PROT UR: 0.8 RATIO
EGFR: 53 ML/MIN/1.73M2
EOSINOPHIL # BLD AUTO: 0.37 K/UL
EOSINOPHIL NFR BLD AUTO: 4.4 %
ESTIMATED AVERAGE GLUCOSE: 114 MG/DL
GLUCOSE QUALITATIVE U: NEGATIVE
GLUCOSE QUALITATIVE U: NEGATIVE
GLUCOSE SERPL-MCNC: 87 MG/DL
HBA1C MFR BLD HPLC: 5.6 %
HCT VFR BLD CALC: 48.4 %
HDLC SERPL-MCNC: 34 MG/DL
HGB BLD-MCNC: 15.5 G/DL
HYALINE CASTS: 1 /LPF
IMM GRANULOCYTES NFR BLD AUTO: 0.1 %
KETONES URINE: NEGATIVE
KETONES URINE: NEGATIVE
LDLC SERPL CALC-MCNC: NORMAL MG/DL
LEUKOCYTE ESTERASE URINE: NEGATIVE
LEUKOCYTE ESTERASE URINE: NEGATIVE
LYMPHOCYTES # BLD AUTO: 2.3 K/UL
LYMPHOCYTES NFR BLD AUTO: 27.1 %
MAN DIFF?: NORMAL
MCHC RBC-ENTMCNC: 29.5 PG
MCHC RBC-ENTMCNC: 32 GM/DL
MCV RBC AUTO: 92 FL
MICROSCOPIC-UA: NORMAL
MONOCYTES # BLD AUTO: 0.6 K/UL
MONOCYTES NFR BLD AUTO: 7.1 %
NEUTROPHILS # BLD AUTO: 5.13 K/UL
NEUTROPHILS NFR BLD AUTO: 60.5 %
NITRITE URINE: NEGATIVE
NITRITE URINE: NEGATIVE
NONHDLC SERPL-MCNC: 211 MG/DL
PH URINE: 5.5
PH URINE: 5.5
PLATELET # BLD AUTO: 246 K/UL
POTASSIUM SERPL-SCNC: 5 MMOL/L
PROT SERPL-MCNC: 7.2 G/DL
PROT UR-MCNC: 107 MG/DL
PROTEIN URINE: ABNORMAL
PROTEIN URINE: ABNORMAL
RBC # BLD: 5.26 M/UL
RBC # FLD: 12 %
RED BLOOD CELLS URINE: 50 /HPF
SODIUM SERPL-SCNC: 139 MMOL/L
SPECIFIC GRAVITY URINE: 1.02
SPECIFIC GRAVITY URINE: 1.02
SQUAMOUS EPITHELIAL CELLS: 0 /HPF
TRIGL SERPL-MCNC: 454 MG/DL
TSH SERPL-ACNC: 2.36 UIU/ML
UROBILINOGEN URINE: NORMAL
UROBILINOGEN URINE: NORMAL
WBC # FLD AUTO: 8.48 K/UL
WHITE BLOOD CELLS URINE: 1 /HPF

## 2022-05-13 RX ORDER — FENOFIBRATE 48 MG/1
48 TABLET ORAL
Qty: 90 | Refills: 3 | Status: COMPLETED | COMMUNITY
Start: 2022-05-13 | End: 2023-05-08

## 2022-05-13 RX ORDER — ERGOCALCIFEROL 1.25 MG/1
1.25 MG CAPSULE, LIQUID FILLED ORAL
Qty: 12 | Refills: 3 | Status: ACTIVE | COMMUNITY
Start: 2022-05-13 | End: 1900-01-01

## 2022-05-20 NOTE — ED ADULT TRIAGE NOTE - WEIGHT IN LBS
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 I have been looking into egg health and some of the research I am coming up with say thyroid function and vitamin D levels play a role in the egg health. Are these something we should be looking into? Should those labs be drawn today as well? I asked the  ahead of time on the tube color and she said all of them including the HCG could be done on the same tube color. The last two on the list below are frozen.    The labs specifically were:  Vitamin D  TSH  Free T4  Free T3  Reverse T3  Thyroid Peroxidase Antibodies  Thyroglobulin antibodies    Thoughts?    Thank you,   Corinna   169.9

## 2022-06-28 DIAGNOSIS — N17.9 ACUTE KIDNEY FAILURE, UNSPECIFIED: ICD-10-CM

## 2022-07-13 ENCOUNTER — APPOINTMENT (OUTPATIENT)
Dept: NEPHROLOGY | Facility: CLINIC | Age: 32
End: 2022-07-13

## 2023-07-05 NOTE — ED ADULT TRIAGE NOTE - TEMPERATURE IN CELSIUS (DEGREES C)
36.9 Crescentic Advancement Flap Text: The defect edges were debeveled with a #15 scalpel blade. Given the location of the defect and the proximity to free margins a crescentic advancement flap was deemed most appropriate. Using a sterile surgical marker, the appropriate advancement flap was drawn incorporating the defect and placing the expected incisions within the relaxed skin tension lines where possible. The area thus outlined was incised deep to adipose tissue with a #15 scalpel blade. The skin margins were undermined to an appropriate distance in all directions utilizing iris scissors. Following this, the designed flap was advanced and carried over into the primary defect and sutured into place.

## 2023-08-24 ENCOUNTER — APPOINTMENT (OUTPATIENT)
Dept: INTERNAL MEDICINE | Facility: CLINIC | Age: 33
End: 2023-08-24

## 2024-10-16 NOTE — ED ADULT NURSE NOTE - 
ADDITIONAL INFORMATION
October 16, 2024   To:  Whom it may concern    Date of Service:  October 16, 2024                Date of Injury:  5/6/2019              DIAGNOSIS:   Right foot: Removal of symptomatic hardware right heel, ORIF subtalar joint with AccuFill bone  graft and bone marrow aspirate right foot, harvesting of bone marrow aspirate right tibia on 8/1/2023     TREATMENT:  Continue Icing and elevating  - clinic order-for adjustment to current right charan brace.    Per work comp approval :   -New ice boot(Ice therapy management system boot).    Arnel can continue to work with the following restrictions:               - No excessive walking                FOLLOW-UP:  Next appointment with Dr Mcclain is scheduled for April 16, 2025 at 8:15 am     If you should have any questions regarding this note, please do not hesitate in calling our office.           Dr. Connre Mcclain   [id number] [facility] [id number]        january 2022